# Patient Record
Sex: FEMALE | Race: BLACK OR AFRICAN AMERICAN | URBAN - METROPOLITAN AREA
[De-identification: names, ages, dates, MRNs, and addresses within clinical notes are randomized per-mention and may not be internally consistent; named-entity substitution may affect disease eponyms.]

---

## 2024-01-21 ENCOUNTER — HOSPITAL ENCOUNTER (EMERGENCY)
Age: 34
Discharge: HOME | End: 2024-01-21
Attending: EMERGENCY MEDICINE
Payer: COMMERCIAL

## 2024-01-21 VITALS
DIASTOLIC BLOOD PRESSURE: 47 MMHG | HEIGHT: 65 IN | OXYGEN SATURATION: 100 % | SYSTOLIC BLOOD PRESSURE: 111 MMHG | HEART RATE: 78 BPM | TEMPERATURE: 98.3 F | WEIGHT: 179.45 LBS | BODY MASS INDEX: 29.9 KG/M2 | RESPIRATION RATE: 14 BRPM

## 2024-01-21 DIAGNOSIS — B34.9 ACUTE VIRAL DISEASE: Primary | ICD-10-CM

## 2024-01-21 LAB
FLUAV RNA UPPER RESP QL NAA+PROBE: NEGATIVE
FLUBV AG NPH QL: NEGATIVE
S PYO AG THROAT QL: NEGATIVE
SARS-COV-2 RDRP RESP QL NAA+PROBE: NOT DETECTED

## 2024-01-21 PROCEDURE — 87081 CULTURE SCREEN ONLY: CPT

## 2024-01-21 PROCEDURE — 99283 EMERGENCY DEPT VISIT LOW MDM: CPT

## 2024-01-21 PROCEDURE — 87804 INFLUENZA ASSAY W/OPTIC: CPT

## 2024-01-21 PROCEDURE — 87635 SARS-COV-2 COVID-19 AMP PRB: CPT

## 2024-01-21 PROCEDURE — 87880 STREP A ASSAY W/OPTIC: CPT

## 2024-01-21 ASSESSMENT — PAIN SCALES - GENERAL
PAINLEVEL_OUTOF10: 5
PAINLEVEL_OUTOF10: 5

## 2024-01-21 ASSESSMENT — PAIN DESCRIPTION - LOCATION
LOCATION: GENERALIZED
LOCATION: GENERALIZED

## 2024-01-21 NOTE — ED NOTES
Dc'd to home  awake alert skin warm and dry  to follow up If not improving  to return for worsening  Walked out with ease

## 2024-01-21 NOTE — DISCHARGE INSTRUCTIONS
1.  Tylenol or ibuprofen over-the-counter for pain and fever.  2.  Drink plenty of fluids.  3.  Follow-up with your primary care physician in the next couple days or call [phone] for primary care referral.  4.  Return emerged department severe worsening signs infection inability to tolerate oral fluid

## 2024-01-24 LAB — S PYO THROAT QL CULT: NORMAL
